# Patient Record
Sex: FEMALE | URBAN - METROPOLITAN AREA
[De-identification: names, ages, dates, MRNs, and addresses within clinical notes are randomized per-mention and may not be internally consistent; named-entity substitution may affect disease eponyms.]

---

## 2023-10-02 ENCOUNTER — TELEPHONE (OUTPATIENT)
Dept: HEMATOLOGY/ONCOLOGY | Facility: HOSPITAL | Age: 38
End: 2023-10-02

## 2023-10-11 ENCOUNTER — TELEPHONE (OUTPATIENT)
Dept: HEMATOLOGY/ONCOLOGY | Facility: HOSPITAL | Age: 38
End: 2023-10-11

## 2023-11-10 NOTE — TELEPHONE ENCOUNTER
Spoke to patient - notified has appointment with Beatrice Hernandez on Monday 11/13 at 10:30am at [address] [place] [address] Patient notified. Will attend scheduled appointment.  To arrive 20minutes early and bring insurance information

## 2023-11-13 NOTE — TELEPHONE ENCOUNTER
MD Surya Galloway Cha RN  Caller: Unspecified (Today,  2:39 PM)  Let her know that if that's what she wants to do it is fine but her anemia is severe and iron tablets aren't going to be sufficient and will not keep up with her blood loss. Tell her that we will give her a test dose and premedicate her as well. If she says no, then prescribe ferrous sulfate 325 mg PO BID. Warn her that it will cause constipation and turn her stools black. She should use stool softener as needed. If she can't tolerate, then she should stop. Spoke with patient - notified of Dr. Sherry Gunn recommendation and iron tablet issues. Patient decided to try the Iron infusion with test dose and premedications. PSR to call patient to schedule appointment.

## 2023-11-13 NOTE — PROGRESS NOTES
GYN PROBLEM VISIT    Subjective: This is a 41year old No obstetric history on file. presenting for GYN visit. She originally stated that she was here for me to order her breast MRI per her oncologist. She said she has a hx of breast cancer in 2020 tx with lumpectomy and chemo plus radiation per pt. She is on tamoxifen. I discussed her periods with her. She reports periods every 1.5-2 months, but also \"sometimes twice in a month\", heavy where she is changing a pad every hour, last 4 days. She had a pelvic ultrasound in 2022 reading:    IMPRESSION:   1. Heterogeneous endometrium. Cysts within the endometrium are   nonspecific and could be related to hyperplasia secondary to tamoxifen. 2. Hemorrhagic cyst right ovary. 3. Complex mass left ovary. This most likely represents a hemorrhagic   cyst.  Recommend follow-up ultrasound in 3 months. She reports having had an EMB following this, but I do not have those results available to me. Last pap: 5/13/2022 normal HPV neg     Review of Systems   Constitutional: Negative. HENT: Negative. Respiratory: Negative. Gastrointestinal: Negative. Endocrine: Negative. Genitourinary: as above  Musculoskeletal: Negative. Skin: Negative. Allergic/Immunologic: Negative. Neurological: Negative. No past medical history on file. No past surgical history on file. No Known Allergies     levothyroxine 100 MCG Oral Tab Take 1 tablet (100 mcg total) by mouth daily. tamoxifen 20 MG Oral Tab Take 1 tablet (20 mg total) by mouth daily. Objective:    Physical Exam     Vitals:    11/13/23 1040   BP: 102/74   Pulse: 90   Resp: 14        Constitutional: She is oriented to person, place, and time. She appears well-developed and well-nourished. Assessment/Plan: This is a 41year old No obstetric history on file. presenting with AUB.     #AUB: on tamoxifen  -pelvic ultrasound ordered  -consider EMB  -pt desires more children, declines hyst  -TSH wnl  -pap UTD    #Breast cancer history  -service to high risk breast clinic  -MRI ordered, but encouraged pt to call insurance prior       Amy Ayala MD

## 2023-11-13 NOTE — TELEPHONE ENCOUNTER
Edith Cooley MD  [address]    Let her know that we received her mammograms and they are in the system. She can schedule the recommended breast MRI. Order is in [address] [place] [address] Rd. Can schedule at [facility]. Patient notified. Phone number given for [employer].

## 2023-11-13 NOTE — TELEPHONE ENCOUNTER
Saman Stover MD  [address] R[address]  Please call patient. Let her know that she is iron deficient and anemic. This is the cause of her fatigue. I recommend IV iron dextran with test dose. The remainder of her labs were normal.    Spoke with patient. Stated the last time she had an Iron infusion she had a reaction. Not sure what IV iron she received. Would like to take oral tablets and prescription to [facility] [employer].

## 2023-11-20 NOTE — PROGRESS NOTES
Pt here for INFED . Pt denies any issues or concerns. Ordering MD: Dr. Hitesh Vences Exp: one-time dose     Pt tolerated infusion without difficulty or complaint. Reviewed next apt date/time: Infusion tolerated without any issues. Patient will just call to make her own appointment. Reminded that she needs labs 4 weeks from her iron infusion. Needs to have her mammogram done, and needs to follow up with Dr. Maria Guadalupe Su in 6 months. Patient verbalized understanding. Given the main number to the [facility]. Education Record  Learner:  Patient  Disease / Diagnosis: Iron Deficiency Anemia   Barriers / Limitations:  None  Method:  Brief focused and Reinforcement  General Topics:  Medication, Side effects and symptom management, and Plan of care reviewed  Outcome:  Shows understanding  INFED infused without any complications. Observed for half hour after infusion. Vital signs taken at the end of the 30-minute observation. Patient denied any complaints/issues. Discharged in good condition. Advised to call for any questions/concerns/issues.   Patient given premeds due to history of iron infusion reaction in the past.

## 2023-11-28 NOTE — PROGRESS NOTES
Referring Provider:  Gloria Tellez MD    Reason for Referral:  Del Ratliff was referred for genetic counseling because of a personal history of breast cancer. Ms. Yessenia Jones is a nulliparous 41year-old woman of [facility] descent who was diagnosed with a right-sided ER/WI-positive, HER2-negative breast cancer at age 37. She had a lumpectomy, adjuvant chemotherapy, and radiation treatment. She is currently taking Tamoxifen. Her ovaries are intact. She has not had a colonoscopy. Social History:  Ms. Yessenia Jones was seen today with her , Theresa Guillory. They live in [place] and recently moved here from [place]. They are planning a pregnancy. Family History:   A three generation pedigree was obtained. Ms. Yessenia Jones has two older brothers and no sisters. Ms. Jason Yu mother is 61years-old and has not had cancer. Ms. Jason Yu mother had two brothers and two sisters. One of Ms. Pierre's maternal uncles  from lung cancer at an unspecified older age. Ms. Jason Yu maternal grandmother  at age 67 and did not have cancer. Ms. Jason Yu maternal grandfather  at age 62 and did not have cancer. Ms. Jason Yu father is 72years-old and has not had cancer. Ms. Jason Yu father had three brothers and one sister, none of whom had cancer. Ms. Jason Yu paternal grandmother  at age 76 and did not have cancer. Ms. Jason Yu paternal grandfather  at age 62 and did not have cancer. Please see the pedigree for additional family history information. Counseling: The following information was discussed with Ms. Pierre. Genetics of Breast Cancer: In the [place], approximately 1 in 8 women will develop breast cancer. Although the majority of breast cancer cases are sporadic, approximately 5-10% of women with breast cancer have a hereditary cancer syndrome.   Signs of a hereditary cancer syndrome include some rare cancers, common cancers occurring at unusually young ages, multiple primary cancers in the same individual, or the same type of cancer or related cancers (e.g., breast and ovarian, colorectal and endometrial) in three or more individuals in the same lineage. Mutations in the genes, BRCA1 and BRCA2, account for the majority of hereditary breast and ovarian cancer families. Mutations in genes other than BRCA1/2, many of which now have medical management recommendations (e.g., NU, CHEK2, PALB2) are identified in 3-10% of individuals tested using a multigene panel. Risk Assessment:   Ms. Zion Munoz meets NCCN Guidelines testing criteria for high-penetrance breast cancer susceptibility genes. Risk assessment to estimate the chance of Ms. Pierre harboring a BRCA1/2 pathogenic variant was done by using the Bob II Model. Based on this model, Ms. Perdues risk of carrying a BRCA1/2 pathogenic variant is estimated to be 13%. It is important to note that all prediction models have limitations and medical management should be based on clinical judgment, and personal and family history. In addition, there are no prediction models or testing criteria for moderate penetrance cancer predisposition genes such as NU and CHEK2. I recommend that testing be performed as part of a multigene panel. Genetic Testing (Panel): The pros, cons, and limitations of genetic testing were discussed including the potential implications of test results on clinical management. If a pathogenic variant is not identified (negative result), it is still possible that Ms. Zion Munoz has a pathogenic variant in one of these genes that was not detected by the genetic test, or that the family is dealing with a hereditary cancer syndrome involving a different gene. It is also possible that Ms. Pierre's relatives have a pathogenic variant in one of these genes that Ms. Pierre did not inherit.  In this scenario, options for cancer screening/management should be determined according to personal and family histories and should be discussed with a physician. A variant of uncertain significance is a DNA change that may or may not alter the function of the gene; therefore, it is usually not possible to determine if the gene variant is responsible for an individual's increased cancer risk. If Ms. Carmen Bustillos is found to carry a pathogenic variant in a cancer predisposition gene, she is at significantly increased risk for various cancers. The magnitude of these risks, and the cancers for which she is at increased risk would depend on the gene involved. Medical recommendations for individuals with BRCA1/2 pathogenic variants were reviewed as an example. It was also explained that for some of the genes for which testing is available, the associated cancer risks have yet to be determined and medical management recommendations may not yet be available for individuals with pathogenic variants in these genes. If she were to test positive for a pathogenic variant, her children and siblings would each have a 50% chance of carrying the same variant. At-risk adults (>18) would have the option of pursuing targeted genetic testing to clarify their cancer risks. Genetic test results have implications for the entire biological family. Thus, it is recommended that she share her genetic test results with her biological family members so that they may have their risk assessed. Genetic Information Non-Discrimination Act: The legal protections of the Genetic Information Nondiscrimination Act (MATIAS) for health insurance and employment were discussed. MATIAS does not provide protection for life insurance, disability or long-term care insurance. Summary and Plan:  Ms. Carmen Bustillos was referred for genetic counseling because of a personal history of breast cancer at age 37. Her reported family history is not highly suspicious for a hereditary cancer syndrome; however, genetic testing on Ms. Pierre for BRCA1/2 pathogenic variants as part of a multigene panel is indicated based on her personal history of cancer. At the conclusion of the counseling session Ms. Pierre decided to proceed with genetic testing. Written consent was obtained. Blood and paperwork were sent to [facility] for their Common Hereditary Cancers panel. I anticipate that Ms. Pierre's results will be available within 2-3 weeks and will call her with the results. Results will also be communicated to Dr. Dillon Granados. Approximately 35 minutes was spent in consultation with Ms. Pierre.

## 2023-11-29 NOTE — TELEPHONE ENCOUNTER
Renata Harden MD  [address]  Let her know that her MRI is negative for mets. She does have a slipped disc at L5/S1. If she wants we can refer to physical therapy. If she says yes, give her the phone number and I'll put in the order. Patient notified. She will call our office if she decides to do physical therapy.

## 2023-12-20 NOTE — PROGRESS NOTES
Referring Provider:                    Hans Calderón MD     Reason for Referral:  Christin Logan had genetic testing performed on 11/28/23 because of a personal history of breast cancer at age 37. Genetic Testing Result:  No known pathogenic variants were found in 48 genes including: APC*, NU*, AXIN2, BAP1, BARD1, BMPR1A, BRCA1, BRCA2, BRIP1, CDH1, CDK4, CDKN2A (p14ARF), CDKN2A (k12BOE0r), CHEK2, CTNNA1, DICER1*, EPCAM*, FH*, GREM1*, HOXB13, KIT, MBD4, MEN1*, MLH1*, MSH2*, MSH3*, MSH6*, MUTYH, NF1*, NTHL1, PALB2, PDGFRA, PMS2*, POLD1*, POLE, PTEN*, RAD51C, RAD51D, SDHA*, SDHB, SDHC*, SDHD, SMAD4, SMARCA4, STK11, TP53, TSC1*, TSC2, VHL. Please refer to the report from [facility] for additional testing information. These results were discussed with Ms. Quiroz's  by phone on 12/20/23. A copy of the results were released to Ms. Pierre via the [employer] portal on 12/20/23. Summary and Plan:  These results indicate that it is unlikely that Ms. Shahid Prater has a pathogenic variant in any of the genes listed above. The etiology of Ms. Perdues breast cancer remains unexplained. The limitations of the testing include the chance that a pathogenic variant in a gene other than those included in this analysis might be the cause of cancer in Ms. Pierre or her relatives. I encourage Ms. Pierre to contact me on an annual basis to see if there have been any updates in genetic testing that would apply to her or to inform me if there are any changes to the family history. In the meantime, Ms. Pierre and her relatives should speak with their physicians to discuss recommended medical management according to their personal and family history.

## 2024-03-08 NOTE — TELEPHONE ENCOUNTER
Patient's  to schedule a consult with Dr Silverio Maria. He will get medical records faxed from previous oncologist in [facility].
No

## 2024-05-16 NOTE — TELEPHONE ENCOUNTER
Tamoxifen    Patient received her script for 30 day supply of Tamoxifen  yesterday however she is  going to [place] until July and is requiring at least 3 months supply, she does not have any more left.

## 2024-08-27 NOTE — PROGRESS NOTES
Patient is here for MD for Breast cancer follow up. Patient continues on Tamoxifen daily. Patient has had frequent periods. She is following up with gyn. Pelvic ultrasound completed in April, normal per patient. Patient is overdue for mammogram.       Education Record    Learner:  Patient    Disease / Diagnosis:  breast cancer     Barriers / Limitations:  None   Comments:    Method:  Discussion   Comments:    General Topics:  Plan of care reviewed   Comments:    Outcome:  Shows understanding   Comments:

## 2024-08-27 NOTE — PROGRESS NOTES
[facility] Progress Note       Patient Name: Bakale, Sharada   YOB: 1985  Medical Record Number: [id number]  Attending Physician: Phillip Daniels M.D.      The 21st Century Cures Act makes medical notes like these available to patients in the interest of transparency. Please be advised this is a medical document. Medical documents are intended to carry relevant information, facts as evident, and the clinical opinion of the practitioner. The medical note is intended as peer to peer communication and may appear blunt or direct. It is written in medical language and may contain abbreviations or verbiage that are unfamiliar.      Date of Visit: 8/27/2024        Chief Complaint  Right sided breast cancer - follow up.    Oncologic History  Sharada Rahul Bakale is a 39 year old female who in 02/2020 underwent right breast lumpectomy and sentinel lymph node biopsy in [facility] for breast cancer. The pathology report is not available. Per the patient, there were two foci of malignancy in the breast; two lymph nodes were resected and were negative for metastasis.     Patient reports that she was 5 months pregnant at the time of diagnosis. She terminated the pregnancy before proceeding with anticancer therapy.     In [facility], she received adjuvant chemotherapy with doxorubicin and cyclophosphamide every 3 weeks followed by weekly paclitaxel. She received adjuvant radiation therapy. In 10/2020, she began adjuvant endocrine therapy with tamoxifen.     After surgery, chemotherapy, and radiation therapy in [facility], she continued endocrine therapy in [place]. She transferred care to me in 11/2023 after moving from [place].     Tamoxifen was continued. A breast MRI was ordered but patient never scheduled. She was found to be iron deficient and was treated with IV iron dextran.     Germline genetic testing was performed in 12/2023. No known pathogenic variants were found in 48 genes including: APC*, NU*,  AXIN2, BAP1, BARD1, BMPR1A, BRCA1, BRCA2, BRIP1, CDH1, CDK4, CDKN2A (p14ARF), CDKN2A ([id number]), CHEK2, CTNNA1, DICER1*, EPCAM*, FH*, GREM1*, HOXB13, KIT, MBD4, MEN1*, MLH1*, MSH2*, MSH3*, MSH6*, MUTYH, NF1*, NTHL1, PALB2, PDGFRA, PMS2*, POLD1*, POLE, PTEN*, RAD51C, RAD51D, SDHA*, SDHB, SDHC*, SDHD, SMAD4, SMARCA4, STK11, TP53, TSC1*, TSC2, VHL.     After being seen for her initial consultation on 11/10/2023, patient failed to follow up as recommended or obtain the ordered breast imaging. She next returned for follow up on 08/27/2024.    History of Present Illness  Patient returns for follow up after failing to do so earlier as advised. She denies any self palpated breast masses. She denies any new respiratory complaints or pain. She does complain of fatigue. She reports heavy menstrual periods. She had pelvic ultrasound through gynecology in 04/2024 but has not followed up with them since and has no scheduled appointment.     Patient failed to have repeat laboratory studies performed as advised after receiving IV iron in 11/20223. She failed to schedule the ordered breast MRI. She has had no breast imaging since 04/2023.    Performance Status   Karnofsky 90% - Normal, only minor signs/symptoms.     Past Medical History (historical data, reviewed by physician)   Right breast cancer (as above).; hypothyroidism.     Past Surgical History (historical data, reviewed by physician)   Right breast lumpectomy with sentinel lymph node biopsy (as above); repair of right tympanic membrane.     Family History (historical data, reviewed by physician)   Maternal uncle with lung cancer.     Social History (historical data, reviewed by physician)   Denies tobacco use.       Current Medications   tamoxifen 20 MG Oral Tab Take 1 tablet (20 mg total) by mouth daily. 90 tablet 1    levothyroxine 100 MCG Oral Tab Take 1 tablet (100 mcg total) by mouth daily.       Allergies   Ms. Pierre has No Known Allergies.     Vital Signs   BP  102/71 (BP Location: Left arm, Patient Position: Sitting, Cuff Size: adult)   Pulse 107   Temp 96.8 °F (36 °C) (Temporal)   Resp 16   Ht 1.549 m (5' 0.98\")   Wt 63.5 kg (140 lb)   SpO2 99%   BMI 26.47 kg/m²     Physical Examination   Constitutional      Well developed, well nourished. Appears close to chronological age. No apparent distress.   Head   Normocephalic and atraumatic.  Eyes   Conjunctiva clear; sclera anicteric.  ENMT                 External nose normal; external ears normal.  Neck                   Supple, without masses.  Hematologic/Lymphatic No cervical, supraclavicular, axillary lymphadenopathy.  Respiratory          Normal effort; no respiratory distress; lungs clear to auscultation bilaterally.  Cardiovascular     Regular rate and rhythm; normal S1S2.  Abdomen            Non-tender; non-distended; no masses; no fluid wave; no hepatosplenomegaly.  Extremities          No lower extremity edema.  Neurologic           Motor and sensory grossly intact.  Psychiatric          Mood and affect appropriate.    Laboratory   Recent Results (from the past 168 hour(s))   CBC W/DIFF [E]    Collection Time: 08/27/24  3:03 PM   Result Value Ref Range    WBC 3.9 (L) 4.0 - 11.0 x10(3) uL    RBC 3.51 (L) 3.80 - 5.30 x10(6)uL    HGB 9.0 (L) 12.0 - 16.0 g/dL    HCT 28.5 (L) 35.0 - 48.0 %    .0 150.0 - 450.0 10(3)uL    MCV 81.2 80.0 - 100.0 fL    MCH 25.6 (L) 26.0 - 34.0 pg    MCHC 31.6 31.0 - 37.0 g/dL    RDW 13.5 %    Neutrophil Absolute Prelim 2.41 1.50 - 7.70 x10 (3) uL    Neutrophil Absolute 2.41 1.50 - 7.70 x10(3) uL    Lymphocyte Absolute 1.03 1.00 - 4.00 x10(3) uL    Monocyte Absolute 0.32 0.10 - 1.00 x10(3) uL    Eosinophil Absolute 0.10 0.00 - 0.70 x10(3) uL    Basophil Absolute 0.03 0.00 - 0.20 x10(3) uL    Immature Granulocyte Absolute 0.00 0.00 - 1.00 x10(3) uL    Neutrophil % 61.9 %    Lymphocyte % 26.5 %    Monocyte % 8.2 %    Eosinophil % 2.6 %    Basophil % 0.8 %    Immature Granulocyte %  0.0 %   COMP METABOLIC PANEL [E]    Collection Time: 24  3:03 PM   Result Value Ref Range    Glucose 105 (H) 70 - 99 mg/dL    Sodium 139 136 - 145 mmol/L    Potassium 3.9 3.5 - 5.1 mmol/L    Chloride 109 98 - 112 mmol/L    CO2 26.0 21.0 - 32.0 mmol/L    Anion Gap 4 0 - 18 mmol/L    BUN 8 (L) 9 - 23 mg/dL    Creatinine 0.70 0.55 - 1.02 mg/dL    Calcium, Total 8.9 8.7 - 10.4 mg/dL    Calculated Osmolality 287 275 - 295 mOsm/kg    eGFR-Cr 114 >=60 mL/min/1.73m2    AST 18 <34 U/L    ALT 11 10 - 49 U/L    Alkaline Phosphatase 60 37 - 98 U/L    Bilirubin, Total 0.3 0.3 - 1.2 mg/dL    Total Protein 6.8 5.7 - 8.2 g/dL    Albumin 4.2 3.2 - 4.8 g/dL    Globulin  2.6 2.0 - 3.5 g/dL    A/G Ratio 1.6 1.0 - 2.0    Patient Fasting for CMP? Patient not present    FERRITIN [E]    Collection Time: 24  3:03 PM   Result Value Ref Range    Ferritin 4.9 (L) 50.0 - 306.0 ng/mL   IRON AND TIBC [E]    Collection Time: 24  3:03 PM   Result Value Ref Range    Iron 17 (L) 50 - 170 ug/dL    Transferrin 338 250 - 380 mg/dL    Total Iron Binding Capacity 420 250 - 425 ug/dL    % Saturation 4 (L) 15 - 50 %   Vitamin B12 with reflex to MMA    Collection Time: 24  3:03 PM   Result Value Ref Range    Vitamin B12 330 211 - 911 pg/mL   HOLD MMA    Collection Time: 24  3:03 PM   Result Value Ref Range    HOLD MMA Auto Resulted    Methylmalonic Acid, Serum    Collection Time: 24  3:03 PM   Result Value Ref Range    Methylmalonic Acid 289 0 - 378 nmol/L     [facility]  Department of Radiology  [address] [place] [address] [address] [facility]  [place] [address] [phone]      Name: Bakale, Sharada Rahul Ord Dr: Bill Salazar MD  : 1985    Age/Sex: 39 year old Female  Pt. Phone: [phone]  Exam Date: 2024  Procedure: US PELVIS (TRANSABDOMINAL AND TRANSVAGINAL) (CPT=76856/14806)    -----------------------------------------------------------------------------------------------------------------------------------------------                  Bill Salazar MD  [address] [place] [address]      Interpretation   PROCEDURE:  US PELVIS (TRANSABDOMINAL AND TRANSVAGINAL) (CPT=76856/96037)     COMPARISON:  None.     INDICATIONS:  N93.9 Abnormal uterine bleeding (AUB) Z85.3 History of breast cancer     TECHNIQUE:  Pelvic ultrasound using transabdominal and endovaginal technique.  Transvaginal ultrasound was used to better evaluate adnexal and endometrial detail.     PATIENT STATED HISTORY: (As transcribed by Technologist)           FINDINGS:                UTERUS:  12.8 x 5.3 x 8.6 cm    Endometrium Thickness:  0.4 cm    Small hypoechoic nodules within the myometrium measuring 2.7 and 1.1 cm are noted.  These are consistent with small uterine fibroids and of doubtful significance.  RIGHT OVARY:  Right ovary could not be visualized due to overlying bowel gas.  LEFT OVARY:  Left ovary measures 2.8 x 2.3 x 1.6 cm and is otherwise unremarkable.  CUL-DE-SAC:  Small amount of free fluid is most likely physiologic.  OTHER:  Negative.                        =====  CONCLUSION:    1. There are small myometrial nodules in uterus consistent with uterine fibroids.  2. Right ovary could not be visualized due to overlying bowel gas.  3. Pelvis is otherwise sonographically unremarkable.        LOCATION:          Dictated by (CST): Adam Diaz MD on 4/16/2024 at 5:05 PM       Finalized by (CST): Adam Diaz MD on 4/16/2024 at 5:07 PM        Impression and Plan   1.   Right sided breast cancer: No records are available from [facility]. She underwent lumpectomy with sentinel lymph node biopsy. Per patient, she had two foci of disease in the breast and no lymph node involvement. She did receive adjuvant chemotherapy and adjuvant radiation therapy. Beginning in 10/2020, she received adjuvant  endocrine therapy on tamoxifen.           To date, patient has been non-compliant with recommended follow up.           Continue tamoxifen without modification. I recommend at least 5 years of therapy with a consideration of 10 years.           Orders provided for mammography to be performed now. If normal, MRI should be performed in 6 months.     2.   Iron deficiency anemia: I recommend therapy with IV iron dextran. Laboratory studies should be repeated in 2 months.    3.   Heavy menses: Pelvic ultrasound shows uterine fibroids. Patient has recurrent iron deficiency. I recommend that she follow up with gynecology.     Planned Follow Up   Patient will return for IV iron as above. She will return for laboratory studies in 2 months. She will return for follow up in 6 months.     Patient will continue to receive longitudinal care by me for the complex care required for the cancer diagnosis including the expected complications related to anticancer therapy.    Electronically signed by:    Phillip Daniels M.D.  Systemic Medical Director of Oncology Services  [facility]

## 2024-09-27 NOTE — PROGRESS NOTES
Education Record    Learner:  Patient    Pt here for: Infed, iron infusion    Barriers / Limitations:  None    Method:  Brief focused, printed material and  reinforcement    General Topics:  Plan of care reviewed    Outcome: Pt tolerated infusion with no c/o. Pre-meds given prior to infusion. Appt in 2 months for Labs only requested.

## 2025-04-15 NOTE — TELEPHONE ENCOUNTER
Pt is scheduled 5/5 for office visit and labs. To obtain her 30 day supply of medication Tamoxifen.

## 2025-05-03 NOTE — PROGRESS NOTES
[facility] Progress Note       Patient Name: Bakale, Sharada   YOB: 1985  Medical Record Number: [id number]  Attending Physician: Phillip Daniels M.D.      The 21st Century Cures Act makes medical notes like these available to patients in the interest of transparency. Please be advised this is a medical document. Medical documents are intended to carry relevant information, facts as evident, and the clinical opinion of the practitioner. The medical note is intended as peer to peer communication and may appear blunt or direct. It is written in medical language and may contain abbreviations or verbiage that are unfamiliar.      Date of Visit: 5/5/2025       Chief Complaint  Right sided breast cancer and iron deficiency - follow up.    Oncologic History  Sharada Rahul Bakale is a 39 year old female who in 02/2020 underwent right breast lumpectomy and sentinel lymph node biopsy in [facility] for breast cancer. The pathology report is not available. Per the patient, there were two foci of malignancy in the breast; two lymph nodes were resected and were negative for metastasis.     Patient reports that she was 5 months pregnant at the time of diagnosis. She terminated the pregnancy before proceeding with anticancer therapy.     In [facility], she received adjuvant chemotherapy with doxorubicin and cyclophosphamide every 3 weeks followed by weekly paclitaxel. She received adjuvant radiation therapy. In 10/2020, she began adjuvant endocrine therapy with tamoxifen.     After surgery, chemotherapy, and radiation therapy in [facility], she continued endocrine therapy in [place]. She transferred care to me in 11/2023 after moving from [place].     Tamoxifen was continued. A breast MRI was ordered but patient never scheduled. She was found to be iron deficient and was treated with IV iron dextran.     Germline genetic testing was performed in 12/2023. No known pathogenic variants were found in 48 genes  including: APC*, NU*, AXIN2, BAP1, BARD1, BMPR1A, BRCA1, BRCA2, BRIP1, CDH1, CDK4, CDKN2A (p14ARF), CDKN2A (y75VTM8g), CHEK2, CTNNA1, DICER1*, EPCAM*, FH*, GREM1*, HOXB13, KIT, MBD4, MEN1*, MLH1*, MSH2*, MSH3*, MSH6*, MUTYH, NF1*, NTHL1, PALB2, PDGFRA, PMS2*, POLD1*, POLE, PTEN*, RAD51C, RAD51D, SDHA*, SDHB, SDHC*, SDHD, SMAD4, SMARCA4, STK11, TP53, TSC1*, TSC2, VHL.     After being seen for her initial consultation on 11/10/2023, patient failed to follow up as recommended or obtain the ordered breast imaging. She next returned for follow up on 08/27/2024. After that visit, patient again failed to follow up in 02/2025 as advised and did not schedule breast MRI in 03/2025 as advised, and did not return for laboratory studies in 11/2024 as advised.     History of Present Illness  Patient returns for follow up after failing to do so earlier as advised. She was to have repeat laboratory studies in 11/2024 but failed to have them drawn. She was to follow up in 02/2025 but failed to do so. She was to undergo breast MRI in 03/2025 but failed to schedule. Although  advised to do, patient did not follow up with gynecology for heavy menstrual bleeding.     She denies any self palpated breast masses. She denies any new respiratory complaints or pain. She does complain of fatigue. She reports heavy menstrual periods.     Performance Status   Karnofsky 90% - Normal, only minor signs/symptoms.     Past Medical History (historical data, reviewed by physician)   Right breast cancer (as above).; hypothyroidism.     Past Surgical History (historical data, reviewed by physician)   Right breast lumpectomy with sentinel lymph node biopsy (as above); repair of right tympanic membrane.     Family History (historical data, reviewed by physician)   Maternal uncle with lung cancer.     Social History (historical data, reviewed by physician)   Denies tobacco use.       Current Medications   tamoxifen 20 MG Oral Tab Take 1 tablet (20 mg  total) by mouth daily. 30 tablet 5    levothyroxine 100 MCG Oral Tab Take 1 tablet (100 mcg total) by mouth in the morning.       Allergies   Ms. Pierre has no known allergies.     Vital Signs   /75 (BP Location: Left arm, Patient Position: Sitting, Cuff Size: large)   Pulse 92   Temp 97.7 °F (36.5 °C) (Temporal)   Resp 16   Ht 1.549 m (5' 0.98\")   Wt 68.5 kg (151 lb)   LMP 08/26/2024 (Approximate)   SpO2 98%   BMI 28.55 kg/m²     Physical Examination   Constitutional      Well developed, well nourished. No apparent distress.   Head   Normocephalic and atraumatic.  Eyes   Conjunctiva clear; sclera anicteric.  ENMT                 External nose normal; external ears normal.  Neck                   Supple, without masses.  Lymphatic  No cervical, supraclavicular, axillary lymphadenopathy.  Breasts   Bilateral breasts without suspicious masses.   Respiratory          Normal effort; no respiratory distress; lungs clear to auscultation bilaterally.  Cardiovascular     Regular rate and rhythm; normal S1S2.  Abdomen            Non-tender; non-distended; no masses; no fluid wave; no hepatosplenomegaly.  Extremities          No lower extremity edema.  Neurologic           Motor and sensory grossly intact.  Psychiatric          Mood and affect appropriate.    Laboratory   Recent Results (from the past week)   CBC W/DIFF [E]    Collection Time: 05/05/25 10:13 AM   Result Value Ref Range    WBC 3.8 (L) 4.0 - 11.0 x10(3) uL    RBC 4.10 3.80 - 5.30 x10(6)uL    HGB 10.7 (L) 12.0 - 16.0 g/dL    HCT 32.8 (L) 35.0 - 48.0 %    .0 150.0 - 450.0 10(3)uL    MCV 80.0 80.0 - 100.0 fL    MCH 26.1 26.0 - 34.0 pg    MCHC 32.6 31.0 - 37.0 g/dL    RDW 13.0 %    Neutrophil Absolute Prelim 2.16 1.50 - 7.70 x10 (3) uL    Neutrophil Absolute 2.16 1.50 - 7.70 x10(3) uL    Lymphocyte Absolute 1.17 1.00 - 4.00 x10(3) uL    Monocyte Absolute 0.31 0.10 - 1.00 x10(3) uL    Eosinophil Absolute 0.08 0.00 - 0.70 x10(3) uL    Basophil  Absolute 0.02 0.00 - 0.20 x10(3) uL    Immature Granulocyte Absolute 0.01 0.00 - 1.00 x10(3) uL    Neutrophil % 57.6 %    Lymphocyte % 31.2 %    Monocyte % 8.3 %    Eosinophil % 2.1 %    Basophil % 0.5 %    Immature Granulocyte % 0.3 %   FERRITIN [E]    Collection Time: 25 10:13 AM   Result Value Ref Range    Ferritin 6 (L) 50 - 306 ng/mL   IRON AND TIBC [E]    Collection Time: 25 10:13 AM   Result Value Ref Range    Iron 31 (L) 50 - 170 ug/dL    Transferrin 365 250 - 380 mg/dL    Total Iron Binding Capacity 402 250 - 425 ug/dL    % Saturation 8 (L) 15 - 50 %     Radiology  [facility]  Department of Radiology  [address] [place] [address] [address] [place] [address] [phone]      Name: Gabby Sharada Ramirez Freire Dr: Phillip Daniels MD  : 1985    Age/Sex: 39 year old Female  Pt. Phone: [phone]  Exam Date: 2024  Procedure: [facility] LANNY 2D+3D DIAGNOSTIC GRETA  BILAT (GVG=66752/92228)   -----------------------------------------------------------------------------------------------------------------------------------------------                  Phillip Daniels MD  [facility] Ita Guerra [address] [facility] [address]      Interpretation   PROCEDURE:  [facility] LANNY 2D+3D DIAGNOSTIC GRETA  BILAT (CPT=77066/36848)     COMPARISON:  External Exams, MG, GRETA DIAGNOSTIC LEFT (CPT=77065), 10/14/2022, 1:45 PM.  External Exams, MG, GRETA LANNY 2D+3D SCREENING BILAT (CPT=77067/01940), 2022, 12:58 PM.  External Exams, MG, GRETA LANNY 2D+3D SCREENING BILAT (CPT=77067/92628),   2022, 11:52 AM.  External Exams, MG, GRETA LANNY 2D+3D SCREENING BILAT (CPT=77067/56715), 2023, 3:14 PM.     INDICATIONS:  Z17.0 Malignant neoplasm of overlapping sites of right breast in female, estrogen receptor positive (HCC) C50.811 Malignant neoplasm of overlapping sites of ri*     VIEWS OBTAINED:  Diagnostic views of both breasts were obtained.    Standard 2D and  additional multiplane thin sections of the breast were obtained for the purpose of Tomosynthesis evaluation.  The images were reconstructed and reviewed on the dedicated Tomosynthesis workstation.     BREAST COMPOSITION:  Heterogeneously dense, which may obscure small masses.     FINDINGS:  No suspicious mass or microcalcifications.  No significant change.  Stable post lumpectomy and radiation changes in the right breast.  Stable benign calcifications in left breast.                   =====  CONCLUSION:       BI-RADS CATEGORY:    DIAGNOSTIC CATEGORY 2 - BENIGN FINDING:       RECOMMENDATIONS:    ROUTINE MAMMOGRAM AND CLINICAL EVALUATION IN 12 MONTHS.                A letter explaining the results in lay terms has been sent to the patient.  This exam was evaluated with a computer-aided device.  This patient's information has been entered into a reminder system with a target due date for the next mammogram.        LOCATION:  [facility]        Dictated by (CST): Macario Alvarez MD on 9/06/2024 at 1:23 PM       Finalized by (CST): Macario Alvarez MD on 9/06/2024 at 1:33 PM          Impression and Plan   1.   Right sided breast cancer: No records are available from [facility]. She underwent lumpectomy with sentinel lymph node biopsy. Per patient, she had two foci of disease in the breast and no lymph node involvement. She did receive adjuvant chemotherapy and adjuvant radiation therapy. Beginning in 10/2020, she received adjuvant endocrine therapy on tamoxifen.           To date, patient has been poorly compliant with follow up and recommended workup.            Continue tamoxifen without modification. Patient indicates that she wishes to become pregnant. Therefore, she will complete 5 years of therapy in 102/2025 and discontinue therapy.           A washout period of 3 months is recommended after tamoxifen before attempting pregnancy.           Patient never scheduled the recommended MRI. She is advised to schedule it now. She is  also provided with orders for mammography to be performed in 10/2025.           Survivorship issues were addressed with the patient. No issues were identified by the patient.      2.   Iron deficiency anemia: Patient failed to get repeat laboratory studies 2 months after IV iron therapy as recommended. Laboratory studies today again show iron deficiency.           She will be contacted and advised to return for IV iron therapy. She also be advised to repeat labs 2 months after infusion.     Planned Follow Up   Patient will return for IV iron as above. She will return for follow up in 6 months.     Electronically Signed by:     Phillip Daniels M.D.  System Medical Director, Oncology Services  [facility]

## 2025-05-07 NOTE — TELEPHONE ENCOUNTER
Test(s) completed: CBC, Iron Studies    Results: low iron levels    Plan:  per Dr Daniels \"Please call patient. Let her know that she is very iron deficient and IV iron is recommended. We have approval. Set her up for IV iron - no test dose but she does need premeds. Also schedule her for a repeat lab draw at the cancer 6 weeks after the IV iron.\"  Spoke with patient. Message sent to PSRs to schedule patient's appts.

## 2025-05-16 NOTE — PROGRESS NOTES
Pt here for Infed . Pt denies any issues or concerns.      Ordering Provider: dr lincoln  Order Exp: ongoing     Pt tolerated infusion without difficulty or complaint. Reviewed next apt date/time: labs 6-27-25      Education Record  Learner:  Patient  Disease / Diagnosis: LAY  Barriers / Limitations:  None  Method:  Brief focused  General Topics:  Plan of care reviewed  Outcome:  Shows understanding

## 2025-07-02 NOTE — TELEPHONE ENCOUNTER
Test(s) completed: CBC, Iron Studies    Results: per Dr Daniels \"Let her know that her iron studies are now normal.   Remind her that she has still not scheduled her breast MRI. She has not scheduled her mammogram. She has not scheduled her follow up with me. I recommend she do all of these.\"     Plan: Patient notified of lab results. Advised to schedule mammogram and MRI. Provided patient with the number to central scheduling. She will call back to schedule MD follow up.